# Patient Record
Sex: FEMALE | Race: WHITE
[De-identification: names, ages, dates, MRNs, and addresses within clinical notes are randomized per-mention and may not be internally consistent; named-entity substitution may affect disease eponyms.]

---

## 2019-09-25 ENCOUNTER — HOSPITAL ENCOUNTER (EMERGENCY)
Dept: HOSPITAL 25 - UCCORT | Age: 59
Discharge: HOME | End: 2019-09-25
Payer: COMMERCIAL

## 2019-09-25 VITALS — DIASTOLIC BLOOD PRESSURE: 75 MMHG | SYSTOLIC BLOOD PRESSURE: 122 MMHG

## 2019-09-25 DIAGNOSIS — Z88.5: ICD-10-CM

## 2019-09-25 DIAGNOSIS — F17.210: ICD-10-CM

## 2019-09-25 DIAGNOSIS — Z88.1: ICD-10-CM

## 2019-09-25 DIAGNOSIS — B86: Primary | ICD-10-CM

## 2019-09-25 DIAGNOSIS — Z88.2: ICD-10-CM

## 2019-09-25 DIAGNOSIS — L03.90: ICD-10-CM

## 2019-09-25 PROCEDURE — 99213 OFFICE O/P EST LOW 20 MIN: CPT

## 2019-09-25 PROCEDURE — G0463 HOSPITAL OUTPT CLINIC VISIT: HCPCS

## 2019-09-25 NOTE — ED
Skin Complaint





- HPI Summary


HPI Summary: 





59 yr old with the complaint of itching, scabs on arms, legs and trunk.  She 

has been itching and diggin at herself for well over a week.  She has some 

increased redness to the left forearm where she has been digging.  She has no 

other complaints.  She has had bed bugs in her house as well. 





- History of Current Complaint


Chief Complaint: UCSkin


Time Seen by Provider: 09/25/19 08:55


Stated Complaint: SKIN COMP


Pain Intensity: 1





- Allergy/Home Medications


Allergies/Adverse Reactions: 


 Allergies











Allergy/AdvReac Type Severity Reaction Status Date / Time


 


codeine Allergy  Nausea And Verified 09/25/19 08:55





   Vomiting  


 


Sulfa (Sulfonamide Allergy  Nausea And Verified 09/25/19 08:55





Antibiotics)   Vomiting  


 


Tetracyclines Allergy  Nausea And Verified 09/25/19 08:55





   Vomiting  














PMH/Surg Hx/FS Hx/Imm Hx


Respiratory History: Reports: Hx Chronic Obstructive Pulmonary Disease (COPD)


Infectious Disease History: No


Infectious Disease History: 


   Denies: Traveled Outside the [place] in Last 30 Days





- Family History


Known Family History: Positive: None





- Social History


Alcohol Use: None


Substance Use Type: Reports: None


Smoking Status (MU): Heavy Every Day Tobacco Smoker


Type: Cigarettes


Amount Used/How Often: 1 PPD


Length of Time of Smoking/Using Tobacco: Since Age 15





Review of Systems


Constitutional: Negative


Positive: Rash


All Other Systems Reviewed And Are Negative: Yes





Physical Exam


Triage Information Reviewed: Yes


Vital Signs On Initial Exam: 


 Initial Vitals











Temp Pulse Resp BP Pulse Ox


 


 98 F   100   24   122/75   88 


 


 09/25/19 08:53  09/25/19 08:53  09/25/19 08:53  09/25/19 08:53  09/25/19 08:53











Vital Signs Reviewed: Yes


Appearance: Positive: Well-Appearing, No Pain Distress


Skin: Positive: Warm, Other - scabs and tracts arms, legs and trunk.  

Consistent with scabies.


Head/Face: Positive: Normal Head/Face Inspection


Eyes: Positive: EOMI


Neck: Positive: Nontender


Respiratory/Lung Sounds: Positive: Clear to Auscultation, Breath Sounds Present


Cardiovascular: Positive: RRR.  Negative: Murmur


Abdomen Description: Negative: Distended


Musculoskeletal: Positive: Strength/ROM Intact


Neurological: Positive: Sensory/Motor Intact, Alert, Oriented to Person Place, 

Time, CN Intact II-III, Normal Gait, Speech Normal


Psychiatric: Positive: Normal





Diagnostics





- Vital Signs


 Vital Signs











  Temp Pulse Resp BP Pulse Ox


 


 09/25/19 08:53  98 F  100  24  122/75  88














- Laboratory


Lab Statement: Any lab studies that have been ordered have been reviewed, and 

results considered in the medical decision making process.





Course/Dx





- Course


Course Of Treatment: scabies with cellulitis.  Rx keflex and permethrin





- Diagnoses


Provider Diagnoses: 


 Scabies, Cellulitis








Discharge ED





- Sign-Out/Discharge


Documenting (check all that apply): Patient Departure


All imaging exams completed and their final reports reviewed: No Studies





- Discharge Plan


Condition: Good


Disposition: HOME


Prescriptions: 


Cephalexin CAP* [Keflex CAP*] 500 mg PO TID #30 cap


Permethrin [Elimite] 60 gm TP ONCE #60 cream..g.


Patient Education Materials:  Scabies (ED), Cellulitis (ED)


Referrals: 


Neda Russo MD [Primary Care Provider] - 2 Days





- Billing Disposition and Condition


Condition: GOOD


Disposition: Home